# Patient Record
Sex: FEMALE | Race: WHITE
[De-identification: names, ages, dates, MRNs, and addresses within clinical notes are randomized per-mention and may not be internally consistent; named-entity substitution may affect disease eponyms.]

---

## 2018-06-28 ENCOUNTER — HOSPITAL ENCOUNTER (EMERGENCY)
Dept: HOSPITAL 26 - MED | Age: 33
Discharge: HOME | End: 2018-06-28
Payer: MEDICAID

## 2018-06-28 VITALS — HEIGHT: 65 IN | BODY MASS INDEX: 26.66 KG/M2 | WEIGHT: 160 LBS

## 2018-06-28 VITALS — SYSTOLIC BLOOD PRESSURE: 114 MMHG | DIASTOLIC BLOOD PRESSURE: 70 MMHG

## 2018-06-28 DIAGNOSIS — G43.909: ICD-10-CM

## 2018-06-28 DIAGNOSIS — Z91.041: ICD-10-CM

## 2018-06-28 DIAGNOSIS — H20.9: Primary | ICD-10-CM

## 2018-06-28 NOTE — NUR
Patient discharged with v/s stable. Written and verbal after care instructions 
given and explained. 

Patient alert, oriented and verbalized understanding of instructions. 
Ambulatory with steady gait. All questions addressed prior to discharge. ID 
band removed. Patient advised to follow up with PMD. Rx of PREDNISONE, 
VOLTAREN, MOTRIN 800MG  given. Patient educated on indication of medication 
including possible reaction and side effects. Opportunity to ask questions 
provided and answered.

## 2018-06-28 NOTE — NUR
PT. CAME INTO THE ED DUE TO HEAVYNESS IN EYES AND LIGHT SENSITIVITY X 7DAYS AND 
HA X 2 DAYS. PT. STATES " I FEEL REALLY DRY MY EYES AND THE LIGHTS HURT, I 
STARTED WITH A HEADACHEABOUT 2 DAYS AGO SO I DECIDED TO COME IN". PT. IS AAOX4, 
DENIES FEVER, DENIES N/V/D, DENIES SOB, DENIES CHEST PAIN. RR EVEN AND 
UNLABORED. DENIES ANY FALLS. PUPILS EQUAL ROUND AND REACTIVE TO LIGHT 3MM 
BILATERALLY. ER MD NOTIFIED. WILL CONTINUE TO MONITOR.

## 2018-08-16 ENCOUNTER — HOSPITAL ENCOUNTER (EMERGENCY)
Dept: HOSPITAL 26 - MED | Age: 33
Discharge: HOME | End: 2018-08-16
Payer: MEDICAID

## 2018-08-16 VITALS — SYSTOLIC BLOOD PRESSURE: 106 MMHG | DIASTOLIC BLOOD PRESSURE: 60 MMHG

## 2018-08-16 VITALS — BODY MASS INDEX: 26.66 KG/M2 | WEIGHT: 160 LBS | HEIGHT: 65 IN

## 2018-08-16 VITALS — SYSTOLIC BLOOD PRESSURE: 110 MMHG | DIASTOLIC BLOOD PRESSURE: 59 MMHG

## 2018-08-16 DIAGNOSIS — R10.31: ICD-10-CM

## 2018-08-16 DIAGNOSIS — Z86.2: ICD-10-CM

## 2018-08-16 DIAGNOSIS — Z91.041: ICD-10-CM

## 2018-08-16 DIAGNOSIS — R35.0: ICD-10-CM

## 2018-08-16 DIAGNOSIS — O26.891: Primary | ICD-10-CM

## 2018-08-16 LAB
BASOPHILS # BLD AUTO: 0 K/UL (ref 0–0.22)
BASOPHILS NFR BLD AUTO: 0.1 % (ref 0–2)
EOSINOPHIL # BLD AUTO: 0.2 K/UL (ref 0–0.4)
EOSINOPHIL NFR BLD AUTO: 2.7 % (ref 0–4)
ERYTHROCYTE [DISTWIDTH] IN BLOOD BY AUTOMATED COUNT: 12.6 % (ref 11.6–13.7)
HCT VFR BLD AUTO: 40.9 % (ref 36–48)
HGB BLD-MCNC: 13.9 G/DL (ref 12–16)
LYMPHOCYTES # BLD AUTO: 1.5 K/UL (ref 2.5–16.5)
LYMPHOCYTES NFR BLD AUTO: 18.1 % (ref 20.5–51.1)
MCH RBC QN AUTO: 31 PG (ref 27–31)
MCHC RBC AUTO-ENTMCNC: 34 G/DL (ref 33–37)
MCV RBC AUTO: 91.2 FL (ref 80–94)
MONOCYTES # BLD AUTO: 0.7 K/UL (ref 0.8–1)
MONOCYTES NFR BLD AUTO: 7.9 % (ref 1.7–9.3)
NEUTROPHILS # BLD AUTO: 6.1 K/UL (ref 1.8–7.7)
NEUTROPHILS NFR BLD AUTO: 71.2 % (ref 42.2–75.2)
PLATELET # BLD AUTO: 165 K/UL (ref 140–450)
RBC # BLD AUTO: 4.48 MIL/UL (ref 4.2–5.4)
WBC # BLD AUTO: 8.5 K/UL (ref 4.8–10.8)

## 2018-08-16 PROCEDURE — 99285 EMERGENCY DEPT VISIT HI MDM: CPT

## 2018-08-16 PROCEDURE — 81002 URINALYSIS NONAUTO W/O SCOPE: CPT

## 2018-08-16 PROCEDURE — 36415 COLL VENOUS BLD VENIPUNCTURE: CPT

## 2018-08-16 PROCEDURE — 76817 TRANSVAGINAL US OBSTETRIC: CPT

## 2018-08-16 PROCEDURE — 85025 COMPLETE CBC W/AUTO DIFF WBC: CPT

## 2018-08-16 PROCEDURE — 81025 URINE PREGNANCY TEST: CPT

## 2018-08-16 PROCEDURE — 84702 CHORIONIC GONADOTROPIN TEST: CPT

## 2019-02-26 NOTE — NUR
33/F PRESENT TO ER C/O ABDOMINAL CRAMPING x 3 DAYS. PT HAS NAUSEA AND VOMITING 
BUT DENIES DIARRHEA OR CONSTIPATION. PT DENIES INJURY OR TRAUMA. LMP: 7/8/2018. 
PT STATED PREGNANT 5 WKS.HX: ANEMIA.MEDS: IRON AND MOTRIN 800MG. SKIN IS 
PINK/WARM/DRY; AAOX4 WITH EVEN AND STEADY GAIT; LUNGS CLEAR BL; HR EVEN AND 
REGULAR; PT DENIES ANY FEVER, CP, SOB, OR COUGH AT THIS TIME; PATIENT STATES 
PAIN OF 6/10 AT THIS TIME; PATIENT POSITIONED FOR COMFORT; HOB ELEVATED; 
BEDRAILS UP X2; BED DOWN. ER MD MADE AWARE OF PT STATUS. No

## 2019-03-07 ENCOUNTER — HOSPITAL ENCOUNTER (OUTPATIENT)
Dept: HOSPITAL 26 - MLD | Age: 34
Setting detail: OBSERVATION
Discharge: HOME | End: 2019-03-07
Attending: OBSTETRICS & GYNECOLOGY | Admitting: OBSTETRICS & GYNECOLOGY
Payer: MEDICAID

## 2019-03-07 VITALS — WEIGHT: 170 LBS | BODY MASS INDEX: 28.32 KG/M2 | HEIGHT: 65 IN

## 2019-03-07 VITALS — DIASTOLIC BLOOD PRESSURE: 67 MMHG | SYSTOLIC BLOOD PRESSURE: 109 MMHG

## 2019-03-07 DIAGNOSIS — R10.2: ICD-10-CM

## 2019-03-07 DIAGNOSIS — Z3A.35: ICD-10-CM

## 2019-03-07 DIAGNOSIS — O26.893: Primary | ICD-10-CM

## 2019-03-07 LAB
BASOPHILS # BLD AUTO: 0 K/UL (ref 0–0.22)
BASOPHILS NFR BLD AUTO: 0.1 % (ref 0–2)
DEPRECATED D DIMER PPP-ACNC: 1140 NG/ML (ref 0–400)
EOSINOPHIL # BLD AUTO: 0.1 K/UL (ref 0–0.4)
EOSINOPHIL NFR BLD AUTO: 0.8 % (ref 0–4)
ERYTHROCYTE [DISTWIDTH] IN BLOOD BY AUTOMATED COUNT: 13.3 % (ref 11.6–13.7)
FIBRINOGEN PPP-MCNC: 518 MG/DL (ref 200–400)
HCT VFR BLD AUTO: 36 % (ref 36–48)
HGB BLD-MCNC: 11.9 G/DL (ref 12–16)
LYMPHOCYTES # BLD AUTO: 1.2 K/UL (ref 2.5–16.5)
LYMPHOCYTES NFR BLD AUTO: 10.6 % (ref 20.5–51.1)
MCH RBC QN AUTO: 29 PG (ref 27–31)
MCHC RBC AUTO-ENTMCNC: 33 G/DL (ref 33–37)
MCV RBC AUTO: 87.3 FL (ref 80–94)
MONOCYTES # BLD AUTO: 0.8 K/UL (ref 0.8–1)
MONOCYTES NFR BLD AUTO: 6.9 % (ref 1.7–9.3)
NEUTROPHILS # BLD AUTO: 9.3 K/UL (ref 1.8–7.7)
NEUTROPHILS NFR BLD AUTO: 81.6 % (ref 42.2–75.2)
PLATELET # BLD AUTO: 198 K/UL (ref 140–450)
PROTHROMBIN TIME: 9.5 SECS (ref 10.8–13.4)
RBC # BLD AUTO: 4.12 MIL/UL (ref 4.2–5.4)
WBC # BLD AUTO: 11.4 K/UL (ref 4.8–10.8)

## 2019-03-07 PROCEDURE — 85379 FIBRIN DEGRADATION QUANT: CPT

## 2019-03-07 PROCEDURE — 85730 THROMBOPLASTIN TIME PARTIAL: CPT

## 2019-03-07 PROCEDURE — G0378 HOSPITAL OBSERVATION PER HR: HCPCS

## 2019-03-07 PROCEDURE — 76805 OB US >/= 14 WKS SNGL FETUS: CPT

## 2019-03-07 PROCEDURE — 86901 BLOOD TYPING SEROLOGIC RH(D): CPT

## 2019-03-07 PROCEDURE — 86886 COOMBS TEST INDIRECT TITER: CPT

## 2019-03-07 PROCEDURE — 36415 COLL VENOUS BLD VENIPUNCTURE: CPT

## 2019-03-07 PROCEDURE — 85025 COMPLETE CBC W/AUTO DIFF WBC: CPT

## 2019-03-07 PROCEDURE — 81000 URINALYSIS NONAUTO W/SCOPE: CPT

## 2019-03-07 PROCEDURE — 85384 FIBRINOGEN ACTIVITY: CPT

## 2019-03-07 PROCEDURE — 85610 PROTHROMBIN TIME: CPT

## 2019-03-07 PROCEDURE — 86900 BLOOD TYPING SEROLOGIC ABO: CPT

## 2019-11-24 NOTE — NUR
US AT BEDSIDE. Breathing spontaneous and unlabored. Breath sounds clear and equal bilaterally with regular rhythm.